# Patient Record
Sex: FEMALE | Race: WHITE
[De-identification: names, ages, dates, MRNs, and addresses within clinical notes are randomized per-mention and may not be internally consistent; named-entity substitution may affect disease eponyms.]

---

## 2023-11-14 ENCOUNTER — RX ONLY (RX ONLY)
Age: 43
End: 2023-11-14

## 2023-11-14 ENCOUNTER — OFFICE (OUTPATIENT)
Dept: URBAN - METROPOLITAN AREA CLINIC 116 | Facility: CLINIC | Age: 43
Setting detail: OPHTHALMOLOGY
End: 2023-11-14

## 2023-11-14 DIAGNOSIS — B30.9: ICD-10-CM

## 2023-11-14 DIAGNOSIS — H11.151: ICD-10-CM

## 2023-11-14 DIAGNOSIS — Z87.828: ICD-10-CM

## 2023-11-14 PROCEDURE — 92004 COMPRE OPH EXAM NEW PT 1/>: CPT | Performed by: PHYSICIAN ASSISTANT

## 2023-11-14 ASSESSMENT — SPHEQUIV_DERIVED: OD_SPHEQUIV: 0.125

## 2023-11-14 ASSESSMENT — CONFRONTATIONAL VISUAL FIELD TEST (CVF)
OS_FINDINGS: FULL
OD_FINDINGS: FULL

## 2023-11-14 ASSESSMENT — CORNEAL SURGICAL SCARRING: OS_SCARRING: CENTRAL ENDOTHELIAL STROMAL

## 2023-11-14 ASSESSMENT — LID EXAM ASSESSMENTS: OS_EDEMA: LLL T

## 2023-11-14 ASSESSMENT — REFRACTION_AUTOREFRACTION
OD_CYLINDER: -1.25
OD_SPHERE: +0.75
OS_SPHERE: UNABLE
OD_AXIS: 172

## 2023-11-19 ENCOUNTER — OFFICE (OUTPATIENT)
Dept: URBAN - METROPOLITAN AREA CLINIC 94 | Facility: CLINIC | Age: 43
Setting detail: OPHTHALMOLOGY
End: 2023-11-19
Payer: COMMERCIAL

## 2023-11-19 ENCOUNTER — RX ONLY (RX ONLY)
Age: 43
End: 2023-11-19

## 2023-11-19 DIAGNOSIS — H11.151: ICD-10-CM

## 2023-11-19 DIAGNOSIS — H35.361: ICD-10-CM

## 2023-11-19 PROBLEM — H16.422 CORNEAL PANNUS; LEFT EYE: Status: ACTIVE | Noted: 2023-11-14

## 2023-11-19 PROBLEM — B30.9 VIRAL CONJUNCTIVITIS, UNSPECIFIED ; LEFT EYE: Status: RESOLVED | Noted: 2023-11-14 | Resolved: 2023-11-19

## 2023-11-19 PROCEDURE — 99213 OFFICE O/P EST LOW 20 MIN: CPT | Performed by: PHYSICIAN ASSISTANT

## 2023-11-19 PROCEDURE — 92250 FUNDUS PHOTOGRAPHY W/I&R: CPT | Performed by: PHYSICIAN ASSISTANT

## 2023-11-19 ASSESSMENT — REFRACTION_AUTOREFRACTION
OD_SPHERE: +0.75
OD_CYLINDER: -1.00
OD_AXIS: 172
OS_SPHERE: UNABLE

## 2023-11-19 ASSESSMENT — CORNEAL SURGICAL SCARRING: OS_SCARRING: CENTRAL ENDOTHELIAL STROMAL

## 2023-11-19 ASSESSMENT — CONFRONTATIONAL VISUAL FIELD TEST (CVF)
OD_FINDINGS: FULL
OS_FINDINGS: FULL

## 2023-11-19 ASSESSMENT — SPHEQUIV_DERIVED: OD_SPHEQUIV: 0.25

## 2024-05-11 ENCOUNTER — OFFICE (OUTPATIENT)
Dept: URBAN - METROPOLITAN AREA CLINIC 94 | Facility: CLINIC | Age: 44
Setting detail: OPHTHALMOLOGY
End: 2024-05-11
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
## 2024-05-11 PROCEDURE — NO SHOW FE NO SHOW FEE: Performed by: PHYSICIAN ASSISTANT

## 2024-07-03 ENCOUNTER — OFFICE (OUTPATIENT)
Dept: URBAN - METROPOLITAN AREA CLINIC 94 | Facility: CLINIC | Age: 44
Setting detail: OPHTHALMOLOGY
End: 2024-07-03
Payer: COMMERCIAL

## 2024-07-03 DIAGNOSIS — Z87.828: ICD-10-CM

## 2024-07-03 DIAGNOSIS — H16.223: ICD-10-CM

## 2024-07-03 DIAGNOSIS — H11.151: ICD-10-CM

## 2024-07-03 DIAGNOSIS — H35.361: ICD-10-CM

## 2024-07-03 PROCEDURE — 92014 COMPRE OPH EXAM EST PT 1/>: CPT | Performed by: PHYSICIAN ASSISTANT

## 2024-07-03 ASSESSMENT — CONFRONTATIONAL VISUAL FIELD TEST (CVF)
OD_FINDINGS: FULL
OS_FINDINGS: FULL

## 2024-08-30 PROBLEM — Z00.00 ENCOUNTER FOR PREVENTIVE HEALTH EXAMINATION: Status: ACTIVE | Noted: 2024-08-30

## 2024-09-18 ENCOUNTER — APPOINTMENT (OUTPATIENT)
Age: 44
End: 2024-09-18
Payer: COMMERCIAL

## 2024-09-18 VITALS — BODY MASS INDEX: 25.95 KG/M2 | WEIGHT: 141 LBS | HEIGHT: 62 IN

## 2024-09-18 DIAGNOSIS — B35.1 TINEA UNGUIUM: ICD-10-CM

## 2024-09-18 DIAGNOSIS — B35.3 TINEA PEDIS: ICD-10-CM

## 2024-09-18 DIAGNOSIS — Z78.9 OTHER SPECIFIED HEALTH STATUS: ICD-10-CM

## 2024-09-18 PROCEDURE — 99203 OFFICE O/P NEW LOW 30 MIN: CPT

## 2024-09-18 RX ORDER — TERBINAFINE HYDROCHLORIDE 250 MG/1
250 TABLET ORAL DAILY
Qty: 30 | Refills: 2 | Status: ACTIVE | COMMUNITY
Start: 2024-09-18 | End: 1900-01-01

## 2024-09-18 NOTE — HISTORY OF PRESENT ILLNESS
[Flip Flops] : flip flops [FreeTextEntry1] : Patient states her toenails are thick and discolored bilateral toes . She has this problem for years and denies any previous treatment

## 2024-09-18 NOTE — ASSESSMENT
[FreeTextEntry1] : Discussed diagnosis and treatment with patient  Discussed etiology of symptoms patient is experiencing  Discussed all risks, complications, and benefits of topical vs. oral anti-fungal therapy. Patient agrees to both Per patient recent physical unremarkable Rx terbinafine 250 mg po qd  Rx antifungal topical bid  Discussed proper shoe gear with patient  Discussed the importance of daily foot exams and pedal hygiene  Explained that the oral antifungal therapy will require 3 months in duration, and that patient must let nails grow out for total 9-12 months to observe clearance of nail mycosis. Patient was advised to discontinue the oral medication if experiencing jaundice or elevated Liver Function Tests. Patient verbalized understanding   Patient to return to the office in 1 month for 1st LFT 
58

## 2024-09-18 NOTE — PHYSICAL EXAM
[General Appearance - Alert] : alert [General Appearance - In No Acute Distress] : in no acute distress [Ankle Swelling (On Exam)] : not present [Delayed in the Right Toes] : capillary refills normal in right toes [Delayed in the Left Toes] : capillary refills normal in the left toes [2+] : left foot posterior tibialis 2+ [1+] : left foot dorsalis pedis 1+ [No Joint Swelling] : no joint swelling [] : normal strength/tone [Normal Foot/Ankle] : Both lower extremities were exposed and visualized. Standing exam demonstrates normal foot posture and alignment. Hindfoot exam shows no hindfoot valgus or varus [FreeTextEntry1] : Nails 1-5 b/l thickened, discolored, dystrophic with subungual debris Right and Left.  Scaling and thickening of the plantar aspect of the foot in a moccasin distribution Right and Left.  No acute signs of bacterial infection noted Right and Left  [Sensation] : the sensory exam was normal to light touch and pinprick [No Focal Deficits] : no focal deficits [Deep Tendon Reflexes (DTR)] : deep tendon reflexes were 2+ and symmetric [Motor Exam] : the motor exam was normal [Oriented To Time, Place, And Person] : oriented to person, place, and time [Impaired Insight] : insight and judgment were intact [Affect] : the affect was normal

## 2024-09-22 RX ORDER — KETOCONAZOLE 20 MG/G
2 CREAM TOPICAL TWICE DAILY
Qty: 60 | Refills: 3 | Status: DISCONTINUED | COMMUNITY
Start: 2024-09-18 | End: 2024-09-22

## 2024-10-21 ENCOUNTER — APPOINTMENT (OUTPATIENT)
Age: 44
End: 2024-10-21
Payer: COMMERCIAL

## 2024-10-21 DIAGNOSIS — B35.1 TINEA UNGUIUM: ICD-10-CM

## 2024-10-21 DIAGNOSIS — I73.89 OTHER SPECIFIED PERIPHERAL VASCULAR DISEASES: ICD-10-CM

## 2024-10-21 DIAGNOSIS — B35.3 TINEA PEDIS: ICD-10-CM

## 2024-10-21 PROCEDURE — 99213 OFFICE O/P EST LOW 20 MIN: CPT

## 2024-10-21 RX ORDER — CLOTRIMAZOLE 10 MG/G
1 CREAM TOPICAL TWICE DAILY
Qty: 45 | Refills: 3 | Status: ACTIVE | COMMUNITY
Start: 2024-10-21 | End: 1900-01-01

## 2024-10-22 LAB
ALBUMIN SERPL ELPH-MCNC: 4.4 G/DL
ALP BLD-CCNC: 80 U/L
ALT SERPL-CCNC: 48 U/L
AST SERPL-CCNC: 24 U/L
BILIRUB DIRECT SERPL-MCNC: 0.1 MG/DL
BILIRUB INDIRECT SERPL-MCNC: 0.1 MG/DL
BILIRUB SERPL-MCNC: 0.2 MG/DL
PROT SERPL-MCNC: 7.2 G/DL

## 2024-11-23 ENCOUNTER — APPOINTMENT (OUTPATIENT)
Age: 44
End: 2024-11-23
Payer: COMMERCIAL

## 2024-11-23 DIAGNOSIS — B35.1 TINEA UNGUIUM: ICD-10-CM

## 2024-11-23 DIAGNOSIS — B35.3 TINEA PEDIS: ICD-10-CM

## 2024-11-23 DIAGNOSIS — I73.89 OTHER SPECIFIED PERIPHERAL VASCULAR DISEASES: ICD-10-CM

## 2024-11-23 PROCEDURE — 99213 OFFICE O/P EST LOW 20 MIN: CPT

## 2024-11-25 LAB
ALBUMIN SERPL ELPH-MCNC: 4.5 G/DL
ALP BLD-CCNC: 76 U/L
ALT SERPL-CCNC: 56 U/L
AST SERPL-CCNC: 24 U/L
BILIRUB DIRECT SERPL-MCNC: 0.1 MG/DL
BILIRUB INDIRECT SERPL-MCNC: 0.4 MG/DL
BILIRUB SERPL-MCNC: 0.5 MG/DL
PROT SERPL-MCNC: 7.5 G/DL

## 2024-12-28 ENCOUNTER — APPOINTMENT (OUTPATIENT)
Age: 44
End: 2024-12-28
Payer: COMMERCIAL

## 2024-12-28 DIAGNOSIS — I73.89 OTHER SPECIFIED PERIPHERAL VASCULAR DISEASES: ICD-10-CM

## 2024-12-28 DIAGNOSIS — B35.1 TINEA UNGUIUM: ICD-10-CM

## 2024-12-28 DIAGNOSIS — M79.673 PAIN IN UNSPECIFIED FOOT: ICD-10-CM

## 2024-12-28 DIAGNOSIS — G89.29 PAIN IN UNSPECIFIED FOOT: ICD-10-CM

## 2024-12-28 DIAGNOSIS — B35.3 TINEA PEDIS: ICD-10-CM

## 2024-12-28 PROCEDURE — 11721 DEBRIDE NAIL 6 OR MORE: CPT | Mod: Q8

## 2024-12-28 PROCEDURE — 99213 OFFICE O/P EST LOW 20 MIN: CPT | Mod: 25

## 2025-04-12 ENCOUNTER — APPOINTMENT (OUTPATIENT)
Age: 45
End: 2025-04-12
Payer: COMMERCIAL

## 2025-04-12 DIAGNOSIS — L60.0 INGROWING NAIL: ICD-10-CM

## 2025-04-12 DIAGNOSIS — L03.032 CELLULITIS OF LEFT TOE: ICD-10-CM

## 2025-04-12 DIAGNOSIS — M79.672 PAIN IN LEFT FOOT: ICD-10-CM

## 2025-04-12 PROCEDURE — 99213 OFFICE O/P EST LOW 20 MIN: CPT | Mod: 25

## 2025-04-12 PROCEDURE — 11750 EXCISION NAIL&NAIL MATRIX: CPT | Mod: TA

## 2025-04-26 ENCOUNTER — OFFICE (OUTPATIENT)
Dept: URBAN - METROPOLITAN AREA CLINIC 94 | Facility: CLINIC | Age: 45
Setting detail: OPHTHALMOLOGY
End: 2025-04-26
Payer: COMMERCIAL

## 2025-04-26 DIAGNOSIS — H16.223: ICD-10-CM

## 2025-04-26 DIAGNOSIS — Z87.828: ICD-10-CM

## 2025-04-26 DIAGNOSIS — H35.361: ICD-10-CM

## 2025-04-26 PROCEDURE — 92014 COMPRE OPH EXAM EST PT 1/>: CPT | Performed by: PHYSICIAN ASSISTANT

## 2025-04-26 PROCEDURE — 92250 FUNDUS PHOTOGRAPHY W/I&R: CPT | Performed by: PHYSICIAN ASSISTANT

## 2025-04-26 ASSESSMENT — TONOMETRY
OD_IOP_MMHG: 14
OS_IOP_MMHG: 17

## 2025-04-26 ASSESSMENT — KERATOMETRY
OD_K1POWER_DIOPTERS: 45.25
OS_K1POWER_DIOPTERS: UTP
OD_K2POWER_DIOPTERS: 47.25
OD_AXISANGLE_DEGREES: 080

## 2025-04-26 ASSESSMENT — REFRACTION_AUTOREFRACTION
OD_SPHERE: +1.00
OD_CYLINDER: -1.25
OD_AXIS: 171
OS_SPHERE: UTP

## 2025-04-26 ASSESSMENT — SUPERFICIAL PUNCTATE KERATITIS (SPK)
OD_SPK: T
OS_SPK: T

## 2025-04-26 ASSESSMENT — VISUAL ACUITY
OS_BCVA: 20/40-1
OD_BCVA: CF

## 2025-04-26 ASSESSMENT — CORNEAL SURGICAL SCARRING: OS_SCARRING: CENTRAL ENDOTHELIAL STROMAL

## 2025-04-26 ASSESSMENT — CONFRONTATIONAL VISUAL FIELD TEST (CVF)
OS_FINDINGS: FULL
OD_FINDINGS: FULL

## 2025-05-17 ENCOUNTER — APPOINTMENT (OUTPATIENT)
Age: 45
End: 2025-05-17
Payer: COMMERCIAL

## 2025-05-17 DIAGNOSIS — B35.1 TINEA UNGUIUM: ICD-10-CM

## 2025-05-17 DIAGNOSIS — L03.032 CELLULITIS OF LEFT TOE: ICD-10-CM

## 2025-05-17 DIAGNOSIS — L60.0 INGROWING NAIL: ICD-10-CM

## 2025-05-17 DIAGNOSIS — M79.673 PAIN IN UNSPECIFIED FOOT: ICD-10-CM

## 2025-05-17 DIAGNOSIS — G89.29 PAIN IN UNSPECIFIED FOOT: ICD-10-CM

## 2025-05-17 DIAGNOSIS — I73.89 OTHER SPECIFIED PERIPHERAL VASCULAR DISEASES: ICD-10-CM

## 2025-05-17 DIAGNOSIS — M79.672 PAIN IN LEFT FOOT: ICD-10-CM

## 2025-05-17 PROCEDURE — 11720 DEBRIDE NAIL 1-5: CPT | Mod: Q8

## 2025-05-17 PROCEDURE — 99213 OFFICE O/P EST LOW 20 MIN: CPT | Mod: 25

## 2025-05-17 RX ORDER — CLOTRIMAZOLE 10 MG/G
1 CREAM TOPICAL
Qty: 1 | Refills: 4 | Status: ACTIVE | COMMUNITY
Start: 2025-05-17 | End: 1900-01-01

## 2025-07-26 ENCOUNTER — APPOINTMENT (OUTPATIENT)
Age: 45
End: 2025-07-26

## 2025-08-09 ENCOUNTER — APPOINTMENT (OUTPATIENT)
Age: 45
End: 2025-08-09
Payer: COMMERCIAL

## 2025-08-09 DIAGNOSIS — L60.0 INGROWING NAIL: ICD-10-CM

## 2025-08-09 DIAGNOSIS — L03.032 CELLULITIS OF LEFT TOE: ICD-10-CM

## 2025-08-09 DIAGNOSIS — M79.672 PAIN IN LEFT FOOT: ICD-10-CM

## 2025-08-09 PROCEDURE — 99214 OFFICE O/P EST MOD 30 MIN: CPT | Mod: 25

## 2025-08-09 PROCEDURE — 11750 EXCISION NAIL&NAIL MATRIX: CPT | Mod: TA

## 2025-08-23 ENCOUNTER — APPOINTMENT (OUTPATIENT)
Age: 45
End: 2025-08-23
Payer: COMMERCIAL

## 2025-08-23 DIAGNOSIS — I73.89 OTHER SPECIFIED PERIPHERAL VASCULAR DISEASES: ICD-10-CM

## 2025-08-23 DIAGNOSIS — M79.672 PAIN IN LEFT FOOT: ICD-10-CM

## 2025-08-23 DIAGNOSIS — B35.1 TINEA UNGUIUM: ICD-10-CM

## 2025-08-23 DIAGNOSIS — L03.032 CELLULITIS OF LEFT TOE: ICD-10-CM

## 2025-08-23 DIAGNOSIS — L60.0 INGROWING NAIL: ICD-10-CM

## 2025-08-23 PROCEDURE — 99213 OFFICE O/P EST LOW 20 MIN: CPT | Mod: 25

## 2025-08-23 PROCEDURE — 11720 DEBRIDE NAIL 1-5: CPT | Mod: Q8
